# Patient Record
Sex: MALE | URBAN - METROPOLITAN AREA
[De-identification: names, ages, dates, MRNs, and addresses within clinical notes are randomized per-mention and may not be internally consistent; named-entity substitution may affect disease eponyms.]

---

## 2022-03-17 ENCOUNTER — OFFICE VISIT (OUTPATIENT)
Dept: URBAN - METROPOLITAN AREA CLINIC 60 | Facility: CLINIC | Age: 42
End: 2022-03-17
Payer: MEDICAID

## 2022-03-17 DIAGNOSIS — H11.151 PINGUECULA, RIGHT EYE: Primary | ICD-10-CM

## 2022-03-17 PROCEDURE — 92004 COMPRE OPH EXAM NEW PT 1/>: CPT | Performed by: OPTOMETRIST

## 2022-03-17 ASSESSMENT — INTRAOCULAR PRESSURE
OD: 14
OS: 14

## 2022-03-17 ASSESSMENT — VISUAL ACUITY
OD: 20/20
OS: 20/20

## 2022-03-17 NOTE — IMPRESSION/PLAN
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 Plan: Patient educated regarding findings. Lissamine green staining done, 2+ nasal OU. No need for surgical intervention. Recommend UV protection and artificial tears as needed for comfort.

## 2025-01-27 ENCOUNTER — OFFICE VISIT (OUTPATIENT)
Dept: URBAN - METROPOLITAN AREA CLINIC 60 | Facility: CLINIC | Age: 45
End: 2025-01-27
Payer: MEDICAID

## 2025-01-27 DIAGNOSIS — H25.89 OTHER AGE-RELATED CATARACT: ICD-10-CM

## 2025-01-27 DIAGNOSIS — H11.151 PINGUECULA, RIGHT EYE: Primary | ICD-10-CM

## 2025-01-27 PROCEDURE — 92014 COMPRE OPH EXAM EST PT 1/>: CPT | Performed by: OPTOMETRIST

## 2025-01-27 ASSESSMENT — INTRAOCULAR PRESSURE
OS: 23
OD: 21